# Patient Record
Sex: FEMALE | Race: WHITE
[De-identification: names, ages, dates, MRNs, and addresses within clinical notes are randomized per-mention and may not be internally consistent; named-entity substitution may affect disease eponyms.]

---

## 2020-01-15 ENCOUNTER — HOSPITAL ENCOUNTER (OUTPATIENT)
Dept: HOSPITAL 53 - M LAB LCGH | Age: 30
End: 2020-01-15
Attending: OBSTETRICS & GYNECOLOGY
Payer: COMMERCIAL

## 2020-01-15 DIAGNOSIS — Z34.90: Primary | ICD-10-CM

## 2021-03-03 ENCOUNTER — HOSPITAL ENCOUNTER (OUTPATIENT)
Dept: HOSPITAL 53 - M LABSMTC | Age: 31
End: 2021-03-03
Attending: ANESTHESIOLOGY
Payer: COMMERCIAL

## 2021-03-03 DIAGNOSIS — Z01.812: Primary | ICD-10-CM

## 2021-03-03 DIAGNOSIS — Z20.822: ICD-10-CM

## 2021-03-08 ENCOUNTER — HOSPITAL ENCOUNTER (OUTPATIENT)
Dept: HOSPITAL 53 - M SDC | Age: 31
Discharge: HOME | End: 2021-03-08
Attending: SURGERY
Payer: COMMERCIAL

## 2021-03-08 VITALS — WEIGHT: 119 LBS | BODY MASS INDEX: 22.47 KG/M2 | HEIGHT: 61 IN

## 2021-03-08 VITALS — SYSTOLIC BLOOD PRESSURE: 119 MMHG | DIASTOLIC BLOOD PRESSURE: 63 MMHG

## 2021-03-08 DIAGNOSIS — K82.8: Primary | ICD-10-CM

## 2021-03-08 PROCEDURE — 47562 LAPAROSCOPIC CHOLECYSTECTOMY: CPT

## 2021-03-08 PROCEDURE — 88304 TISSUE EXAM BY PATHOLOGIST: CPT

## 2021-03-08 RX ADMIN — FENTANYL CITRATE PRN MCG: 50 INJECTION, SOLUTION INTRAMUSCULAR; INTRAVENOUS at 13:38

## 2021-03-08 RX ADMIN — FENTANYL CITRATE PRN MCG: 50 INJECTION, SOLUTION INTRAMUSCULAR; INTRAVENOUS at 13:43

## 2021-03-08 RX ADMIN — FENTANYL CITRATE PRN MCG: 50 INJECTION, SOLUTION INTRAMUSCULAR; INTRAVENOUS at 13:48

## 2021-03-08 RX ADMIN — FENTANYL CITRATE PRN MCG: 50 INJECTION, SOLUTION INTRAMUSCULAR; INTRAVENOUS at 13:33

## 2021-03-08 NOTE — RO
OPERATIVE NOTE



DATE OF OPERATION: 03/08/2021



PREOPERATIVE DIAGNOSIS:  Biliary dyskinesia.



POSTOPERATIVE DIAGNOSIS: Biliary dyskinesia.



PROCEDURE: Robotic cholecystectomy. 



SURGEON: Benito Soria MD



ASSISTANT: Edwina Thayer NP



ANESTHESIA: General. 



EBL:   5 mL. 



COMPLICATIONS:  None. 



INDICATIONS FOR PROCEDURE: The patient is a 30-year-old female who presents

with right upper quadrant pains, found to have likely biliary dyskinesia. 

Recommendations were to proceed with a robotic cholecystectomy. Risks and

benefits of the procedure not limited to but including bleeding, infection,

hernia formation, damage to surrounding structures and need for further surgery

were discussed in detail with the patient. Informed consent was obtained and

procedure was planned.



DESCRIPTION OF PROCEDURE: The patient was brought back to operating room 7.

After sufficient sedation, the abdomen was sterilely prepped and draped. Next,

timeout was done to confirm proper patient and proper procedure. Following

that, an 8 mm incision was made in the left upper quadrant, and the Veress

needle was inserted. The abdomen was insufflated to 15 mmHg. Veress needle was

then removed. An 8 mm Optiview port was used to gain access to the abdomen.

Once the abdomen was entered, there was a small umbilical hernia as well as a

loop of small bowel adhered inferior to the umbilicus in the midline. The rest

of the upper abdomen was normal.  I was able to get three more ports in the

right upper quadrant. Next, the fundus of the gallbladder was elevated up

towards the right shoulder. The cystic duct and cystic artery were carefully

dissected free using a combination of blunt and sharp dissection. Once they

were both clearly identified, they were both doubly clipped and cut. The

gallbladder was then dissected free from the gallbladder fossa using

electrocautery. Once the gallbladder was dissected free from the gallbladder

fossa using cautery and removed intact.   It was placed inside of a 5-mm Endo

Catch bag and brought out through the right lateral port site. Once the

gallbladder was removed, the abdomen was desufflated. Skin incisions were

closed with 4-0 Vicryl subcuticular sutures. The abdomen was cleaned and dried.

Steri-Strips, 4x4 and tape were applied.

## 2021-12-16 ENCOUNTER — HOSPITAL ENCOUNTER (OUTPATIENT)
Dept: HOSPITAL 53 - M RAD | Age: 31
End: 2021-12-16
Attending: SURGERY
Payer: COMMERCIAL

## 2021-12-16 DIAGNOSIS — K43.9: Primary | ICD-10-CM

## 2021-12-16 NOTE — REP
INDICATION:

HERNIA W/O OBSTRUCTION.



COMPARISON:

None.



TECHNIQUE:

Real-time sonographic evaluation of the midline epigastric region before and during

Valsalva along with Doppler.



FINDINGS:

There are no abnormalities.



IMPRESSION:

Negative exam





<Electronically signed by Froylan Steve > 12/16/21 0949

## 2022-01-14 ENCOUNTER — HOSPITAL ENCOUNTER (OUTPATIENT)
Dept: HOSPITAL 53 - M LABSMTC | Age: 32
End: 2022-01-14
Attending: ANESTHESIOLOGY
Payer: COMMERCIAL

## 2022-01-14 DIAGNOSIS — Z20.822: ICD-10-CM

## 2022-01-14 DIAGNOSIS — Z01.812: Primary | ICD-10-CM

## 2025-01-08 ENCOUNTER — HOSPITAL ENCOUNTER (OUTPATIENT)
Dept: HOSPITAL 53 - M SFHCRHEU | Age: 35
End: 2025-01-08
Attending: INTERNAL MEDICINE
Payer: COMMERCIAL

## 2025-01-08 DIAGNOSIS — M25.551: Primary | ICD-10-CM

## 2025-01-08 DIAGNOSIS — Z53.9: ICD-10-CM
